# Patient Record
Sex: MALE | Race: WHITE
[De-identification: names, ages, dates, MRNs, and addresses within clinical notes are randomized per-mention and may not be internally consistent; named-entity substitution may affect disease eponyms.]

---

## 2018-05-07 ENCOUNTER — HOSPITAL ENCOUNTER (OUTPATIENT)
Dept: HOSPITAL 17 - HDIC | Age: 68
LOS: 1 days | Discharge: HOME | End: 2018-05-08
Attending: INTERNAL MEDICINE
Payer: COMMERCIAL

## 2018-05-07 VITALS
SYSTOLIC BLOOD PRESSURE: 111 MMHG | OXYGEN SATURATION: 95 % | HEART RATE: 66 BPM | DIASTOLIC BLOOD PRESSURE: 63 MMHG | RESPIRATION RATE: 18 BRPM | TEMPERATURE: 97.8 F

## 2018-05-07 VITALS
DIASTOLIC BLOOD PRESSURE: 52 MMHG | HEART RATE: 60 BPM | OXYGEN SATURATION: 97 % | TEMPERATURE: 97.8 F | SYSTOLIC BLOOD PRESSURE: 100 MMHG | RESPIRATION RATE: 18 BRPM

## 2018-05-07 VITALS
DIASTOLIC BLOOD PRESSURE: 67 MMHG | TEMPERATURE: 97.6 F | SYSTOLIC BLOOD PRESSURE: 112 MMHG | HEART RATE: 69 BPM | RESPIRATION RATE: 16 BRPM | OXYGEN SATURATION: 94 %

## 2018-05-07 VITALS
SYSTOLIC BLOOD PRESSURE: 118 MMHG | TEMPERATURE: 98 F | DIASTOLIC BLOOD PRESSURE: 75 MMHG | RESPIRATION RATE: 14 BRPM | HEART RATE: 61 BPM | OXYGEN SATURATION: 98 %

## 2018-05-07 VITALS
OXYGEN SATURATION: 99 % | HEART RATE: 99 BPM | TEMPERATURE: 98 F | RESPIRATION RATE: 16 BRPM | SYSTOLIC BLOOD PRESSURE: 149 MMHG | DIASTOLIC BLOOD PRESSURE: 102 MMHG

## 2018-05-07 VITALS — HEART RATE: 65 BPM

## 2018-05-07 VITALS — HEART RATE: 62 BPM

## 2018-05-07 VITALS — HEART RATE: 72 BPM

## 2018-05-07 VITALS — HEART RATE: 61 BPM

## 2018-05-07 DIAGNOSIS — J44.9: ICD-10-CM

## 2018-05-07 DIAGNOSIS — I48.91: Primary | ICD-10-CM

## 2018-05-07 DIAGNOSIS — I10: ICD-10-CM

## 2018-05-07 DIAGNOSIS — E78.5: ICD-10-CM

## 2018-05-07 DIAGNOSIS — I47.1: ICD-10-CM

## 2018-05-07 DIAGNOSIS — G47.30: ICD-10-CM

## 2018-05-07 DIAGNOSIS — Z79.01: ICD-10-CM

## 2018-05-07 LAB
BASOPHILS # BLD AUTO: 0.1 TH/MM3 (ref 0–0.2)
BASOPHILS NFR BLD: 0.9 % (ref 0–2)
BUN SERPL-MCNC: 22 MG/DL (ref 7–18)
CALCIUM SERPL-MCNC: 9.2 MG/DL (ref 8.5–10.1)
CHLORIDE SERPL-SCNC: 105 MEQ/L (ref 98–107)
CREAT SERPL-MCNC: 1 MG/DL (ref 0.6–1.3)
EOSINOPHIL # BLD: 0.3 TH/MM3 (ref 0–0.4)
EOSINOPHIL NFR BLD: 4.3 % (ref 0–4)
ERYTHROCYTE [DISTWIDTH] IN BLOOD BY AUTOMATED COUNT: 12.9 % (ref 11.6–17.2)
GFR SERPLBLD BASED ON 1.73 SQ M-ARVRAT: 74 ML/MIN (ref 89–?)
GLUCOSE SERPL-MCNC: 97 MG/DL (ref 74–106)
HCO3 BLD-SCNC: 28.8 MEQ/L (ref 21–32)
HCT VFR BLD CALC: 44.5 % (ref 39–51)
HGB BLD-MCNC: 15.3 GM/DL (ref 13–17)
INR PPP: 1.1 RATIO
LYMPHOCYTES # BLD AUTO: 1.6 TH/MM3 (ref 1–4.8)
LYMPHOCYTES NFR BLD AUTO: 21.2 % (ref 9–44)
MCH RBC QN AUTO: 31.8 PG (ref 27–34)
MCHC RBC AUTO-ENTMCNC: 34.2 % (ref 32–36)
MCV RBC AUTO: 92.8 FL (ref 80–100)
MONOCYTE #: 0.8 TH/MM3 (ref 0–0.9)
MONOCYTES NFR BLD: 10.8 % (ref 0–8)
NEUTROPHILS # BLD AUTO: 4.7 TH/MM3 (ref 1.8–7.7)
NEUTROPHILS NFR BLD AUTO: 62.8 % (ref 16–70)
PLATELET # BLD: 222 TH/MM3 (ref 150–450)
PMV BLD AUTO: 7.9 FL (ref 7–11)
PROTHROMBIN TIME: 10.8 SEC (ref 9.8–11.6)
RBC # BLD AUTO: 4.8 MIL/MM3 (ref 4.5–5.9)
SODIUM SERPL-SCNC: 140 MEQ/L (ref 136–145)
WBC # BLD AUTO: 7.5 TH/MM3 (ref 4–11)

## 2018-05-07 PROCEDURE — 85002 BLEEDING TIME TEST: CPT

## 2018-05-07 PROCEDURE — C1730 CATH, EP, 19 OR FEW ELECT: HCPCS

## 2018-05-07 PROCEDURE — C1731 CATH, EP, 20 OR MORE ELEC: HCPCS

## 2018-05-07 PROCEDURE — 80048 BASIC METABOLIC PNL TOTAL CA: CPT

## 2018-05-07 PROCEDURE — 93662 INTRACARDIAC ECG (ICE): CPT

## 2018-05-07 PROCEDURE — 00537 ANES CARDIAC EP PROCEDURES: CPT

## 2018-05-07 PROCEDURE — 93613 INTRACARDIAC EPHYS 3D MAPG: CPT

## 2018-05-07 PROCEDURE — 93656 COMPRE EP EVAL ABLTJ ATR FIB: CPT

## 2018-05-07 PROCEDURE — 85610 PROTHROMBIN TIME: CPT

## 2018-05-07 PROCEDURE — C1766 INTRO/SHEATH,STRBLE,NON-PEEL: HCPCS

## 2018-05-07 PROCEDURE — 86900 BLOOD TYPING SEROLOGIC ABO: CPT

## 2018-05-07 PROCEDURE — 93623 PRGRMD STIMJ&PACG IV RX NFS: CPT

## 2018-05-07 PROCEDURE — 93005 ELECTROCARDIOGRAM TRACING: CPT

## 2018-05-07 PROCEDURE — C2630 CATH, EP, COOL-TIP: HCPCS

## 2018-05-07 PROCEDURE — 93312 ECHO TRANSESOPHAGEAL: CPT

## 2018-05-07 PROCEDURE — 93320 DOPPLER ECHO COMPLETE: CPT

## 2018-05-07 PROCEDURE — 93325 DOPPLER ECHO COLOR FLOW MAPG: CPT

## 2018-05-07 PROCEDURE — 86850 RBC ANTIBODY SCREEN: CPT

## 2018-05-07 PROCEDURE — 85730 THROMBOPLASTIN TIME PARTIAL: CPT

## 2018-05-07 PROCEDURE — 86901 BLOOD TYPING SEROLOGIC RH(D): CPT

## 2018-05-07 PROCEDURE — 92960 CARDIOVERSION ELECTRIC EXT: CPT

## 2018-05-07 PROCEDURE — 85025 COMPLETE CBC W/AUTO DIFF WBC: CPT

## 2018-05-07 PROCEDURE — C1759 CATH, INTRA ECHOCARDIOGRAPHY: HCPCS

## 2018-05-07 PROCEDURE — C1732 CATH, EP, DIAG/ABL, 3D/VECT: HCPCS

## 2018-05-07 RX ADMIN — APIXABAN SCH MG: 5 TABLET, FILM COATED ORAL at 20:34

## 2018-05-07 RX ADMIN — PHENYLEPHRINE HYDROCHLORIDE SCH MLS/HR: 10 INJECTION INTRAVENOUS at 07:37

## 2018-05-07 NOTE — CATHPROC
Patient Name: SHAHRAM RAMIREZ   MRN: [id number]       Study #: [id number]

Initial MD: Leidy Mc       YOB: 1950  Study Date: 05/07/2018

 

 

              Cardiac Catheterization Report

 

 

 

 

5/7/2018 10:33:05 AM        Financial #: [id number]     1 of 11

Patient Name: SHAHRAM RAMIREZ         MRN: [id number]                  Study #: [id number]

Initial MD: Leidy Mc            YOB: 1950             Study Date: 05/07/2018

 

 

                    Entire Case Report

Patient Information

Patient Name    SHAHRAM RAMIREZ   Date of Birth   1950        Age     67 years

 

MRN         [id number]          Financial #    [id number]       Gender    M

 

AlternateID                   Lab Number     2

 

Accession #     [id number]      Room Number    DC05

 

Height (in)     70.0             Height (cm)    177.8           BSA     2.12

 

Weight (lbs)    207.5            Weight (kg)    94.3

 

Patient Address/Phone Number

Home Address              City                 State   Zip Code      Home Phone Number

 

[address] [facility]    [address] [phone]

 

Study Information

Study Number    Admission          Scheduled Start                Study Start

 

[id number]    May 7 2018 6:38AM      05/07/2018                   May 7 2018 7:04AM

 

[place] [facility]

 

Electrophysiology Study

 

Admit Source              Facility Department

 

Other                  [facility] - Cath Lab

 

Physician and Clinical Staff

Initial Leidy Humphrey          Circulator        Matty Poole,RT(R)

 

                         Circulator        Paulino, Em,RCIS

 

                         Other           Anesthesia, CRNA

 

                         Recorder         Esperanza Maki,LEVI

 

                         Scrub           Taylor Shelley,RRT TECH2

 

Procedures Performed

Procedure                    Location (Site)               Vessel Name

 

Ablation Procedure

Cardioversion

ICE CATHETER INSERT               RA                      Atruim

RF Ablation                   LT. ATRIUM                  LT. ATRIUM

 

 

 

 

5/7/2018 10:33:05 AM              Financial #: [id number]                2 of 11

Patient Name: SHAHRAM RAMIREZ        MRN: [id number]       Study #: [id number]

Initial MD: Leidy Mc            YOB: 1950  Study Date: 05/07/2018

 

 

Equipment

Time        Description            Size   Mfg Part Number  Used/Scraped

                NEEDLE, TRANSSEPTAL NRG 98         [id number]

07:05   [facility]                                   Used

                C1                     *[id number]

     [employer]/ EP                       [id number]

07:05              KIT, TRANSDUCER / AFIB                    Used

     PACER                               *[id number]

                                      [id number]

                CATHETER, TACTICATH ABLAT          BUNDLE

07:05   BUNDLE-[employer]                                   Used

                65 BUNDLE                  *[id number]-

                                      BUNDLE

                                      [id number]

                CATHETER, FR7 OPTIMA SPIRAL

07:05   BUNDLE-[employer]                     FR7   *[id number]-     Used

                BUNDLE

                                      BUNDLE

                                      [id number]

07:05   BUNDLE-[employer]    CATHETER, JSN, QUAD BUNDLE     FR 5   *[id number]-     Used

                                      BUNDLE

                                      [id number]

07:05   BUNDLE-[employer]    CATHETER, JSN, QUAD BUNDLE     FR 5   *[id number]-     Used

                                      BUNDLE

                                      [id number]

                SET, COOL POINT TUBING

07:05   BUNDLE-[employer]                          *[id number]-     Used

                BUNDLE

                                      BUNDLE

                SHEATH, FR8.5 STEERABLE SM

07:05   BUNDLE-[employer]                     71CM   [id number]   Used

                71CM BUNDLE

                COVER, TRANSDUCER CABLE           [phone]

07:05   [employer]                                  Used

                ACUNAV                   *[id number]

                                      [id number]

07:05   [employer]/PACER     SHEATH, FR10 CHUCK 11CM      FR 10           Used

                                      *[id number]

07:05   [employer]/PACER     SHEATH, FR9 CHUCK 11CM      FR 9   [id number]     Used

                                      [id number]

07:05   [employer]  PACK, CCL CUSTOM          *             Used

                                      *[id number]

07:05   [employer]     ADDISON, LIMB            *    2530 *[id number]   Used

 

08:24   [employer]     SHEATH, FR5.5 PRELUDE 11CM     FR 5   [id number]  Used

                                      [id number]

07:05   NAMIC         TUBING, HIGH PRESSURE 48"     48"            Used

                                      *[id number]

                                      [id number]

07:05   NAMIC         TUBING, HIGH PRESSURE 48"     48"            Used

                                      *[id number]

                                      [id number]

07:05   [employer]     BLANKET,WARM AIR CCL        *             Used

                                      *[id number]

                                      [id number]

07:05   [employer]   ELECTRODE KIT, SONIA X SURFACE *                Used

                                      *[id number]

                                      [id number]

07:05   [employer]   SHEATH, EPS, FR6 FAST CATH     FR 6            Used

                                      *[id number]

07:05   [employer]   SHEATH, EPS, FR7 FAST CATH     FR 7   [id number]      Used

                                      [id number]

07:05   [employer]   SHEATH, EPS, FR8 FAST CATH     FR 8            Used

                                      *[id number]

                CATHETER, ACUNAV FR10 ICE          [id number]

08:29   MILAGRO                         FR 10           Used

                (MILAGRO)                  *[id number]

     [facility]     PAD, ELECTROSURGICAL

07:05                               *     *[id number] Used

     SURGICAL       GROUNDING (BLUE)

 

 

 

 

5/7/2018 10:33:05 AM             Financial #: [id number]      3 of 11

Patient Name: SHAHRAM RAMIREZ           MRN: [id number]              Study #: [id number]

Initial MD: Leidy Mc               YOB: 1950         Study Date: 05/07/2018

 

 

Insurance Information

Insurance Payor

 

Medicare

 

Third Party         Third Party Number

 

BC MEDICARE [id number]

 

 

History: Allergies

Allergy              Reaction

 

No Known Allergies

 

 

History: Risk Factors

Dyslipidemia

 

Yes

 

 

Labs

Hgb (g/dl)      Hct (%)         RBC (MIL/MM3)     WBC (l/cumm)      Platelets (thousands)

 

11.60-17.00     35.00-51.00       4.00-5.90       4.00-11.00       150..00

 

15.0         44           4.8          7.5          222

 

Glucose (mg/dl)   BUN (mg/dl)       Creatinine (mg/dl)  BUN:Creatinine (1:x)

74..00     7.00-18.00       0.50-1.30       10.00-20.00

 

97          22           1.0          22

 

Na (meq/l)      K (meq/l)

 

136..00    3.50-5.10

 

140         3.9

 

INR (PTT:PT)

 

0.90-1.10

 

1.1

 

 

 

 

Medication

Medication Total Dose (Bolus/Oral)

Medication            Total Dosage/Unit

 

1% XYLOCAINE              40 mL

 

HEPARIN              38931 units

 

LASIX                 40 mg

 

PROTAMINE               40 mg

 

 

 

 

5/7/2018 10:33:05 AM                Financial #: [id number]             4 of 11

Patient Name: SHAHRAM RAMIREZ             MRN: [id number]                  Study #: [id number]

Initial MD: Leidy Mc                YOB: 1950             Study Date: 05/07/2018


 

Medications (Bolus/Oral)

Medication          Time Given          Dosage/Unit      Administered By      Reason

 

1% XYLOCAINE         5/7/2018 8:20:47 AM      20 mL        Leidy Mc

20 mL 1% XYLOCAINE given in lab by Leidy Mc in Left Groin via Subcutaneous.

 

1% XYLOCAINE         5/7/2018 8:24:55 AM      20 mL        Leidy Mc

20 mL 1% XYLOCAINE given in lab by Leidy Mc in Right Groin via Subcutaneous.

 

HEPARIN           5/7/2018 8:32:00 AM    12501 units       Anesthesia, CRNA      As per physicians ve
rbal order

85261 units HEPARIN given in lab by Anesthesia, CRNA via Peripheral IV. Ordered by Leidy Mc. Augustina
son: As per physicians verbal order.

 

HEPARIN           5/7/2018 8:50:00 AM     2000 units       Anesthesia, CRNA      As per physicians ve
rbal order

2000 units HEPARIN given in lab by Anesthesia, CRNA via Peripheral IV. Ordered by Leidy Mc. Reas
on: As per physicians verbal order.

 

HEPARIN           5/7/2018 9:06:01 AM     1000 units       Anesthesia, CRNA      As per physicians ve
rbal order

1000 units HEPARIN given in lab by Anesthesia, CRNA via Peripheral IV. Ordered by Leidy Mc. Reas
on: As per physicians verbal order.

 

LASIX            5/7/2018 10:22:00 AM      40 mg        Anesthesia, CRNA      As per physicians verba
l order

40 mg LASIX given in lab by Anesthesia, CRNA via Peripheral IV. Ordered by Leidy Mc. Reason: As 
per physicians verbal order.

 

PROTAMINE          5/7/2018 10:22:43 AM      40 mg        Anesthesia, CRNA      As per physicians mendy
bal order

40 mg PROTAMINE given in lab by Anesthesia, CRNA via Peripheral IV. Ordered by Leidy Mc. Reason:
 As per physicians verbal order.

 

Medication (Drip)

Medication          Time Given          Dosage/Unit      Concentration/Unit  Diluent (ml)   Solution

 

HEPARIN DRIP         5/7/2018 8:50:50 AM     1000 units/hr       54089 units    250        D5W

1000 units/hr HEPARIN DRIP given in lab by Anesthesia, CRNA via Peripheral IV. Pump/Drip Flow = 10 ml
/hr using D5W with a concentration of

25465 units in 250 ml. Ordered by Leidy Mc. Reason: As per physicians verbal order.

ISUPREL           5/7/2018 9:58:00 AM      20 mcg/min         1 mg     250        NaCl .9

20 mcg/min ISUPREL given in lab by Anesthesia, CRNA via Peripheral IV. Pump/Drip Flow = 300 ml/hr usi
ng NaCl .9 with a concentration of 1 mg in

250 ml. Ordered by Leidy Mc. Reason: As per physicians verbal order.

 

Initial Case Assessment

 

 

 

 

5/7/2018 10:33:05 AM                 Financial #: [id number]                   5 of 11

Patient Name: SHAHRAM RAMIREZ         MRN: [id number]          Study #: [id number]

Initial MD: Leidy Mc             YOB: 1950     Study Date: 05/07/2018

 

 

Initial Case Assessment

Cardiovascular

HR           Rhythm         NIBP          Chest Pain

 

101           af           143/100         0

 

Edema Present      Skin color           Skin

 

None          Normal             Warm Dry

 

Circulatory - Right Pulses

Dorsalis Pedis

 

1

 

Scale (0,1,2,3,4,d)

 

Circulatory - Left Pulses

Dorsalis Pedis

 

1

 

Scale (0,1,2,3,4,d)

 

Circulatory - Lower Extremities

Color Lower Right    Color Lower Left

 

 

Normal         Normal

 

Neurological State

            Oriented to time-place-

Alert                      Moves all extremities

            person

 

Respiration - General

Respiration Rate

            SpO2 (%)

(B/min)

18           99

 

 

 

 

5/7/2018 10:33:05 AM              Financial #: [id number]        6 of 11

Patient Name: SHAHRAM RAMIREZ          MRN: [id number]                Study #: [id number]

Initial MD: Leidy Mc              YOB: 1950           Study Date: 05/07/2018

 

 

Final Case Assessment

Cardiovascular

HR           Rhythm          NIBP          Chest Pain

 

79           sr            104/45         0

 

Edema Present      Skin color           Skin

 

None           Normal             Warm Dry

 

Circulatory - Right Pulses

Dorsalis Pedis

 

1

 

Scale (0,1,2,3,4,d)

 

Circulatory - Left Pulses

Dorsalis Pedis

 

1

 

Scale (0,1,2,3,4,d)

 

Circulatory - Lower Extremities

Color Lower Right    Color Lower Left

 

 

Normal         Normal

 

Neurological State

Unresponsive

 

Comment: intubated

 

Respiration - General

Respiration Rate

            SpO2 (%)

(B/min)

12           99

 

 

 

 

Chronological Log

Time    Study Chronological Log

 

7:37:00   Patient arrived via Bed.

 

7:37:00   Patient Name, D.O.B, / Armband Verified By R.N.

 

7:37:01   Consent signed by the physician and the patient and verified by the Cath Lab staff.

 

7:37:03   Pre-op and post- op instructions given; patient acknowledges understanding of instructions.


 

7:37:04   Verbal Stimulation=2 Physical Stimulation=2 Airway=2 Respiration=2 TOTAL=8. (0=absent, 1=li
mited, 2=present)

 

7:37:21   Patient has been NPO for More than 6Hrs.

 

7:37:22   Skin Breakdown-none per pt

 

7:37:40   Patient Warmer Placed on the Table.

 

 

5/7/2018 10:33:05 AM               Financial #: [id number]              7 of 11

Patient Name: SHAHRAM RAMIREZ            MRN: [id number]               Study #: [id number]

Initial MD: Leidy Mc               YOB: 1950          Study Date: 05/07/2018

 

7:37:43   Disposable Defibrillator Pads Placed On Patient.

 

7:37:43   Disposable Defibrillator Pads Placed On Patient.

 

7:37:44   Disposable Defibrillator Pads Placed On Patient.

 

7:37:46   Norma Prominences Protected

 

7:37:51   A # 20 IV was noted in the Antecubital (left). Grade = 0 0.9ns kvo

 

7:38:06   A # 20 IV was noted in the Antecubital (right). Grade = 0 0.9ns kvo

 

7:38:17   History and physical on the chart or being dictated.

 

7:53:59   Assessment: Initial Case

 

7:54:01   Table restraints applied according to hospital policy

      Assessment: Initial Case, RR=753 BPM, Rhythm=af, NZDQ=407/100 mmhg, Chest Pain=0, Edema=None,

      Color=Normal, Skin = Warm, Dry

      Right Pulses: Yung Ped=1

      Left Pulses: Yung Ped=1

7:55:03   Feet cool to touch.

      Lower Right Extremities: Color=Normal

      Lower Left Extremities: Color=Normal

      Neurological: State=Alert, Ox3, FERRELL

      Respiration: Resp=18 B/min, SpO2=99 %

8:02:51   External catheter placed.

 

8:03:13   Bilateral groins prepped with 2% chlorhexidine, and draped after a 3 minute waiting time.

 

8:07:28   Anesthesiologist present for intubation.

      Time Out. Correct patient, procedure, procedure equipment, site and side verified with physicia
n present. Time

8:18:00

      concurred by MD, individual staff and CRNA.

      Time Out #2 - Consents verified, patient in correct position, all results are labled and displa
yed, safety precautions

8:18:24

      taken, antibiotics administered. Time out concurred by MD, individual staff and CRNA in procedu
re

8:18:28   Case Start

 

8:18:42   Alfonso in progress.

 

8:20:18   Alfonso complete.

 

8:20:47   20 mL 1% XYLOCAINE given in lab by Leidy Mc in Left Groin via Subcutaneous.

 

8:21:07   Vascular access was obtained in the Fem Vein (left).

 

8:21:20   Vascular access was obtained in the Fem Vein (left).

 

8:21:51   Vascular access was obtained in the Fem Vein (left).

 

8:22:18   Vascular access was obtained in the Fem Art (left).

      A SHEATH, FR5.5 PRELUDE 11CM FR 5 was advanced into the Fem Art (left) using the Modified Seldi
nger technique.

8:22:39

      0.9ns pressure bag connected.

8:24:04   A SHEATH, EPS, FR6 FAST CATH FR 6 was advanced into the Fem Vein (left) using the Modified 
Seldinger technique.

 

8:24:13   A SHEATH, EPS, FR7 FAST CATH FR 7 was advanced into the Fem Vein (left) using the Modified 
Seldinger technique.

 

8:24:17   A SHEATH, FR10 CHUCK 11CM FR 10 was advanced into the Fem Vein (left) using the Modified S
eldinger technique.

 

8:24:55   20 mL 1% XYLOCAINE given in lab by Leidy Mc in Right Groin via Subcutaneous.

 

8:25:17   Vascular access was obtained in the Fem Vein (right).

 

8:25:34   A SHEATH, EPS, FR8 FAST CATH FR 8 was advanced into the Fem Vein (right) using the Modified
 Seldinger technique.

 

8:26:16   CATHETER, ACUNAV FR10 ICE (MILAGRO) FR 10 Was Postioned.

 

 

 

5/7/2018 10:33:05 AM               Financial #: [id number]               8 of 11

Patient Name: SHAHRAM RAMIREZ          MRN: [id number]               Study #: [id number]

Initial MD: Leidy Mc              YOB: 1950          Study Date: 05/07/2018

 

      A CATHETER, JSN, QUAD BUNDLE FR 5 was advanced vis Fem Vein (left) and placed in the CS. Placem
ent was visually

8:27:04

      confirmed under fluoroscopy.

      A CATHETER, JSN, QUAD BUNDLE FR 5 was advanced vis Fem Vein (left) and placed in the HIS. Place
ment was

8:27:21

      visually confirmed under fluoroscopy.

      A SHEATH, FR8.5 STEERABLE SM 71CM BUNDLE 71CM was exchanged in the Fem Vein (right). This was n
ecessary in

8:27:30

      order for catheter support.

8:28:33   Tow in

 

8:29:51   Beginning temp 36.0.

      51822 units HEPARIN given in lab by Anesthesia, CRNA via Peripheral IV. Ordered by Satrhak Mc Reason: As per

8:32:00

      physicians verbal order.

8:37:25   A eps was advanced to the right atrium and passed through the septal wall to the left atriu
m.

 

8:37:32   Tow out

      A CATHETER, FR7 OPTIMA SPIRAL BUNDLE FR7 was advanced vis Fem Vein (right) and placed in the LA
. Placement

8:38:48

      was visually confirmed under fluoroscopy. Mapping in progress.

8:41:36   Activated Clotting Time Drawn

 

8:49:09   ACT (Normal Range ) = 340

      2000 units HEPARIN given in lab by Anesthesia, CRNA via Peripheral IV. Ordered by Leidy Mc Reason: As per

8:50:00

      physicians verbal order.

      1000 units/hr HEPARIN DRIP given in lab by Anesthesia, CRNA via Peripheral IV. Pump/Drip Flow =
 10 ml/hr using

8:50:50

      D5W [place] [address] units in 250 ml. Ordered by Leidy Mc. Reason: As per [facility] verbal order.

8:50:50   Mapping complete. Catheter was removed

      A CATHETER, TACTICATH ABLAT 65 BUNDLE was advanced vis Fem Vein (right) and placed in the LA. P
lacement was

8:51:32

      visually confirmed under fluoroscopy.

8:51:42   RF Ablation of the LT. ATRIUM with a CATHETER,  TACTICATH ABLAT 65 BUNDLE.

 

8:57:41   Activated Clotting Time Drawn

 

9:05:23   ACT (Normal Range ) = 348

      1000 units HEPARIN given in lab by Anesthesia, CRNA via Peripheral IV. Ordered by Leidy Mc
. Reason: As per

9:06:01

      physicians verbal order.

9:11:26   Activated Clotting Time Drawn

 

9:17:19   ACT (Normal Range ) = 364

 

9:41:51   Ablation complete. Catheter was removed

      A CATHETER, FR7 OPTIMA SPIRAL BUNDLE FR7 was advanced vis Fem Vein (right) and placed in the LA
. Placement

9:42:18

      was visually confirmed under fluoroscopy.

9:45:47   Mapping catheter out.

      A CATHETER, TACTICATH ABLAT 65 BUNDLE was advanced vis Fem Vein (right) and placed in the LA. P
lacement was

9:45:59

      visually confirmed under fluoroscopy.

9:46:11   RF Ablation of the LT. ATRIUM with a eps.

 

9:46:25   Activated Clotting Time Drawn

 

9:53:20   ACT (Normal Range ) = 347

 

9:57:12   ECG rhythm of AF noted. Patient cardioverted at 200 joules. Success synch

      20 mcg/min ISUPREL given in lab by Anesthesia, CRNA via Peripheral IV. Pump/Drip Flow = 300 ml/
hr using NaCl .9

9:58:00

      with a concentration of 1 mg in 250 ml. Ordered by Leidy Mc. Reason: As per physicians mendy
bal order.

9:58:05   Ending body temp 36.2

 

10:10:06  Isuprel off

 

10:12:00  All catheter(s) removed without difficulty

 

5/7/2018 10:33:05 AM               Financial #: [id number]              9 of 11

Patient Name: SHAHRAM RAMIREZ            MRN: [id number]              Study #: [id number]

Initial MD: Leidy Mc               YOB: 1950         Study Date: 05/07/2018

 

       A SHEATH, FR9 CHUCK 11CM FR 9 was exchanged in the Fem Vein (right). This was necessary in or
jonel to minimize

10:12:50

       site leakage.

10:15:23   PACU called. Spoke to Balbina

 

10:16:25   Bedside Report will be given.

 

10:16:31   Sheath(s) left in place, secured, 0.9ns kvo connected and will be removed in Holding Area

 

10:16:52   Defibrillator and ground pads removed. Skin intact.

 

10:16:56   Ablation procedure performed: AFIB.

 

10:17:00   Sterile dressing applied to site

 

10:17:01   Ablation procedure performed: AFIB.

 

10:17:09   EP Procedure was performed.

       Assessment: Final Case, HR=79 BPM, Rhythm=sr, JTIG=444/45 mmhg, Chest Pain=0, Edema=None, [place]
r=Normal,

       Skin = Warm, Dry

       Right Pulses: Yung Ped=1

       Left Pulses: Yung Ped=1

10:17:49

       Lower Right Extremities: Color=Normal

       Lower Left Extremities: Color=Normal

       Neurological: State=Unresponsive, Comment=intubated

       Respiration: Resp=12 B/min, SpO2=99 %

       40 mg LASIX given in lab by Anesthesia, CRNA via Peripheral IV. Ordered by Leidy Mc. Reas
on: As per physicians

10:22:00

       verbal order.

       40 mg PROTAMINE given in lab by Anesthesia, CRNA via Peripheral IV. Ordered by Leidy Mc. 
Reason: As per

10:22:43

       physicians verbal order.

10:28:55   Activated Clotting Time Drawn

 

10:32:00   Pt extubated. To pacu w crna, 02, monitor

 

10:32:33   ACT (Normal Range ) = 126

 

10:35:10   Patient moved to stretcher

 

 

 

 

End Study - Contrast Media Used In Study

Contrast        Total Opened (mL)     Total Used (mL)      Total Wasted (mL)

 

Unspecified      0             0             0

 

End Study - Maximum Contrast Load

Max Contrast Load (mL)

 

471.6

 

End Study - Radiation Exposure

Fluoro Time

(minutes)

3.2

 

 

 

 

5/7/2018 10:33:05 AM                 Financial #: [id number]              10 of 11

Patient Name: SHAHRAM RAMIREZ    MRN: [id number]       Study #: [id number]

Initial MD: Leidy Mc        YOB: 1950  Study Date: 05/07/2018

 

End Study - Patient Disposition

Complications    Transferred To   Interventional Outcome

 

No          Telemetry Bed    successful

 

 

 

 

5/7/2018 10:33:05 AM         Financial #: [id number]     11 of 11

## 2018-05-07 NOTE — PD.CARD
Atrial Fibrillation Ablation


PROCEDURE DATE:  May 7, 2018


PROCEDURES PERFORMED:


1. Electrophysiology study on Isuprel infusion


2. CS cannulation


3. 3-D mapping


4. Transseptal approach


5. Right and left heart catheterization


6. Intracardiac echo


7. Radiofrequency ablation of atrial fibrillation


8. Pulmonary vein isolation


9. Posterior wall ablation


10. Mitral valve isolation


11. Mitral line creation


12. Left atrial tachycardia ablation


13. Left atrial appendage ablation


14. Floor line creation


15. Anterior wall ablation


16. Cardioversion





INDICATIONS FOR THE PROCEDURE


Mr. Mohan is a 67-year-old  male with atrial fibrillation, very 

symptomatic referred for electrophysiology study and ablation. .


The risks, the nature and the benefits of the procedure were clearly stated to 

him.  The risks include pneumothorax, cardiac perforation, stroke, need for 

open heart surgery and even death.  The patient understood and agreed to 

proceed.





DESCRIPTION OF THE PROCEDURE IN DETAIL


As  written informed consent was obtained prior to esophageal echocardiogram, 

the patient was kept on the table where he was prepped and draped in the usual 

sterile fashion.  Conscious sedation was initiated and maintained throughout 

the procedure by the anesthesiologist.  Once sedation was verified, the right 

and left inguinal areas were anesthetized with 2% Xylocaine.  Using modified 

Seldinger technique, the left femoral vein was cannulated on three occasions, 

three guidewires were advanced.  Over the wire a 6, 7 and a 10-Czech Hemaquet 

were advanced.  Then the left femoral artery was cannulated on one occasion, 

one guidewire was advanced.  Over the wire a 4-Czech Hemaquet was advanced.  

Then the right femoral vein was cannulated on one occasion, one guidewire was 

advanced.  Over the wire a 8-Czech Hemaquet was advanced.  Then under 

fluoroscopic guidance through the 6 and 7-Czech Hemaquet, two 5-Czech 

Olman curved quadripolar electrophysiology catheters were advanced and 

placed around the His as well as coronary sinus.  Basic interval was measured.  

The patient was in atrial fibrillation.  Through the 10-Czech Hemaquet, a 

Cordis Min AcuNav intracardiac echo catheter was advanced and placed at the 

right atrium.  Multiple view was obtained.  There was no pericardial effusion, 

pulmonary vein was seen, atrial septal was visualized.  Then the 8-Czech 

Hemaquet in the right femoral vein was exchanged for Agilis transseptal sheath 

that was placed all the way to the superior vena cava.  Through the sheath a 

Marie needle was advanced, then the sheath, the dilator and the needle were 

progressed until foci engaged.  Once engaged, the needle was advanced.  RF was 

delivered for 2 seconds.  I was able to cross into the left atrium.  Once the 

needle crossed, the dilator was advanced.  Once the dilator crossed, the sheath 

was advanced.  Once the sheath crossed, the dilator and the needle were 

removed.  At this point I did flood the system and fluid movement was seen in 

the left atrium the indicates the sheath is in good position.  The patient 

already received 10,000 units of heparin.  The goal is to keep an ACT around 

350 during ablation.  Then through the sheath a [employer] Avery 20 pulse 

circumferential catheter was advanced.  Using [employer] endocardial solution 

mapping system, a two-dimensional configuration of the left atrium was 

obtained.  Points were taken at the left superior and inferior veins, right 

superior and inferior veins, mitral valve, and appendages.  Then through the 

sheath a [employer]. Avery TactiCath 65cm 3.5mm irrigated tipped mapping and 

radiofrequency ablation catheter was advanced.  Esophageal probe was placed 

temperature monitoring during ablation.  When it increased to 0.5 degrees 

Celsius above baseline, I moved to a different area of the atrium.  First I did 

isolate the left superior and inferior vein..  Posterior was ablated.  A floor 

line was created, a mitral line was isolated, then the mitral valve was 

isolated.  At that point the patient was in left atrial tachycardia. left 

atrial appendage was isolated.





I did create a line from the floor to the roof area, passing by the left atrial 

appendage.  Then the right superior and inferior veins were isolated.  I did 

remap the atrium.  There is no significant signal in the atrium. I did 

decannulate transeptal and proceed with CS isolation. Part of tricuspid valve 

was ablated.  At this point I decided to proceed with cardioversion.  A 200 

sync biphasic joule was delivered that converted the patient into sinus rhythm.

  At that point I did advance the circumferential catheter again into the vein.

  There was no signal into the vein, pacing from the vein showed no conduction 

to the atrium.  Isuprel infusion was initiated at 20 mcg for over 10 minutes.  

No tachyarrhythmia was induced, post Isuprel no tachyarrhythmia was induced.  

At that point the procedure was complete.  All catheters were removed, atrial 

septal sheath was exchanged for 9-Czech Hemaquet, intracardiac echo showed no 

pericardial effusion.  There is still good flow in the pulmonary vein.  The 

patient is going to be transferred to the recovery room.  No incident report.  

The patient tolerated the procedure.  Blood loss was minimal.





FINDINGS


1. Electrocardiogram: At baseline the patient was in atrial fibrillation, post 

procedure the patient was in sinus rhythm.


2. Basic interval: Base cycle length was around 600.  Post ablation she was 

around 980 milliseconds.  AH at 96 and HV at 45 milliseconds.


3. Tachyarrhythmia: Atrial fibrillation was mapped and ablated.  Atrial 

tachycardia was ablated.  The ablation was successful.





CONCLUSION


Successful electrophysiology study, mapping, radiofrequency ablation of atrial 

fibrillation, left atrial tachycardia, pulmonary vein isolation, posterior 

ablation, mitral valve isolation, mitral line creation, left atrial appendage 

isolation, floor line creation and cardioversion.





COMMENTS AND RECOMMENDATIONS


The patient is going to be transferred to the telemetry unit.  Will be observed 

and when stable can be discharged home.











Leidy Mc MD May 7, 2018 10:39

## 2018-05-08 VITALS
RESPIRATION RATE: 19 BRPM | TEMPERATURE: 97.6 F | OXYGEN SATURATION: 95 % | HEART RATE: 60 BPM | DIASTOLIC BLOOD PRESSURE: 67 MMHG | SYSTOLIC BLOOD PRESSURE: 135 MMHG

## 2018-05-08 VITALS — HEART RATE: 64 BPM

## 2018-05-08 VITALS
OXYGEN SATURATION: 94 % | TEMPERATURE: 98.3 F | DIASTOLIC BLOOD PRESSURE: 64 MMHG | HEART RATE: 66 BPM | SYSTOLIC BLOOD PRESSURE: 103 MMHG | RESPIRATION RATE: 18 BRPM

## 2018-05-08 VITALS
OXYGEN SATURATION: 97 % | DIASTOLIC BLOOD PRESSURE: 71 MMHG | TEMPERATURE: 98.1 F | RESPIRATION RATE: 18 BRPM | HEART RATE: 71 BPM | SYSTOLIC BLOOD PRESSURE: 140 MMHG

## 2018-05-08 VITALS — HEART RATE: 70 BPM

## 2018-05-08 VITALS — HEART RATE: 60 BPM

## 2018-05-08 VITALS — HEART RATE: 94 BPM

## 2018-05-08 VITALS — HEART RATE: 67 BPM

## 2018-05-08 VITALS — HEART RATE: 66 BPM

## 2018-05-08 VITALS — HEART RATE: 69 BPM

## 2018-05-08 VITALS — HEART RATE: 63 BPM

## 2018-05-08 VITALS — HEART RATE: 75 BPM

## 2018-05-08 LAB
INR PPP: 1.1 RATIO
PROTHROMBIN TIME: 10.9 SEC (ref 9.8–11.6)

## 2018-05-08 RX ADMIN — PHENYLEPHRINE HYDROCHLORIDE SCH MLS/HR: 10 INJECTION INTRAVENOUS at 00:35

## 2018-05-08 RX ADMIN — APIXABAN SCH MG: 5 TABLET, FILM COATED ORAL at 09:04

## 2018-05-08 NOTE — HHI.PR
Subjective


Remarks


Feeling better





Lungs: ventilated





Heart: S1, S2 regular, no gallop





Abdomen: soft, no mass





Ext: no edema








Current Medications








 Medications


  (Trade)  Dose


 Ordered  Sig/Shivani


 Route  Start Time


 Stop Time Status Last Admin


 


 Sodium Chloride  500 ml @ 


 30 mls/hr  [id number]


 IV  5/7/18 08:00


    5/7/18 07:37


 


 


  (Ativan)  1 mg  ON  CALL


 SL  5/7/18 07:45


 5/10/18 07:44   


 


 


 Lactated Ringer's  1,000 ml @ 


 30 mls/hr  Q24H PRN


 IV  5/7/18 07:45


 5/10/18 07:44   


 


 


 Sodium Chloride  500 ml @ 


 30 mls/hr  [id number] PRN


 IV  5/7/18 07:45


 5/10/18 07:44   


 


 


  (Lopressor)  25 mg  ON CALL  PRN


 PO  5/7/18 07:45


 5/10/18 07:44   


 


 


  (Betadine 5%


 Antisepsis Kit)  1 applic  ON CALL  PRN


 EACH NARE  5/7/18 07:45


 5/10/18 07:44   


 


 


  (Chlorhexidine


 2% Cloth)  3 pack  ON CALL  PRN


 TOPICAL  5/7/18 07:45


 5/10/18 07:44   


 


 


  (Percocet  5-325


 Mg)  1 tab  Q4H  PRN


 PO  5/7/18 10:30


     


 


 


  (Percocet  5-325


 Mg)  2 tab  Q4H  PRN


 PO  5/7/18 10:30


     


 


 


  (Atropine Inj)  0.5 mg  UNSCH  PRN


 IV PUSH  5/7/18 10:30


     


 


 


  (Zofran Inj)  4 mg  Q4H  PRN


 IV PUSH  5/7/18 10:30


     


 


 


  (Cordarone)  200 mg  DAILY


 PO  5/7/18 10:30


    5/8/18 09:04


 


 


  (Eliquis)  5 mg  BID


 PO  5/7/18 21:00


    5/8/18 09:04


 


 


  (Lipitor)  10 mg  HS


 PO  5/7/18 21:00


    5/7/18 20:34


 











Objective





Vital Signs








  Date Time  Temp Pulse Resp B/P (MAP) Pulse Ox O2 Delivery O2 Flow Rate FiO2


 


5/8/18 13:00  75      


 


5/8/18 12:00  60      


 


5/8/18 11:00 97.6 56 19 135/67 (89) 95   


 


5/8/18 11:00  60      


 


5/8/18 10:00  94      


 


5/8/18 09:00  66      


 


5/8/18 08:00  70      


 


5/8/18 07:00     97 Room Air  


 


5/8/18 07:00 98.1 60 18 140/71 (94) 97   


 


5/8/18 07:00  71      


 


5/8/18 06:20  67      


 


5/8/18 05:05  70      


 


5/8/18 04:14  69      


 


5/8/18 03:34 98.3 66 18 103/64 (77) 94   


 


5/8/18 03:33  64      


 


5/8/18 02:00  64      


 


5/8/18 01:05  70      


 


5/8/18 00:00  63      


 


5/7/18 23:28 97.8 60 18 100/52 (68) 97   


 


5/7/18 23:00  61      


 


5/7/18 22:00  62      


 


5/7/18 21:00  62      


 


5/7/18 20:00  72      


 


5/7/18 19:45     95 Room Air  


 


5/7/18 19:45 97.8 66 18 111/63 (79) 95   


 


5/7/18 19:00  65      


 


5/7/18 15:00 98.0 61 14 118/75 (89) 98   














I/O      


 


 5/7/18 5/7/18 5/7/18 5/8/18 5/8/18 5/8/18





 07:00 15:00 23:00 07:00 15:00 23:00


 


Intake Total  0 ml 480 ml 240 ml  


 


Output Total   1225 ml   


 


Balance  0 ml -745 ml 240 ml  


 


      


 


Intake Oral   480 ml 240 ml  


 


IV Total  0 ml    


 


Output Urine Total   1225 ml   


 


# Voids    2  








Result Diagram:  


5/7/18 0705 5/7/18 0705








Assessment and Plan


Problem List:  


(1) Atrial fibrillation


ICD Codes:  I48.91 - Unspecified atrial fibrillation


Plan:  In sinus rhythm


SP ablation


Doing well


can be DH


 case discussed extensively with wife


She was worry about left neck "apparent lump". There is no change on inspection


There was no subclavian, no jugular access. This is only fat tissue


follow up as previously scheduled





(2) Palpitations


ICD Codes:  R00.2 - Palpitations


Plan:  No tachy reported





(3) Shortness of breath


ICD Codes:  R06.02 - Shortness of breath


Plan:  Significantly improve














Leidy Mc MD May 8, 2018 13:38

## 2018-05-08 NOTE — EKG
Date Performed: 05/07/2018       Time Performed: 10:53:47

 

PTAGE:      67 years

 

EKG:      Sinus rhythm 

 

 NORMAL ECG 

 

NO PREVIOUS TRACING            

 

DOCTOR:   Josias Galicia  Interpretating Date/Time  05/08/2018 14:32:46

## 2018-05-08 NOTE — EKG
Date Performed: 05/08/2018       Time Performed: 04:27:30

 

PTAGE:      67 years

 

EKG:      Sinus rhythm 

 

 Prolonged QT interval rSr'(V1) - probable normal variant Septal T wave changes are nonspecific Low Q
RS voltages in limb leads Borderline ECG

 

PREVIOUS TRACING       : 05/07/2018 10.53

 

DOCTOR:   Josias Galicia  Interpretating Date/Time  05/08/2018 10:51:47

## 2018-05-09 NOTE — EKG
Date Performed: 05/07/2018       Time Performed: 07:31:00

 

PTAGE:      67 years

 

EKG:      Atrial fibrillation with PVC(s). Possible septal infarct - age undetermined Inferior T wave
 changes are nonspecific Low QRS voltages in limb leads Abnormal ECG 

 

NO PREVIOUS TRACING            

 

DOCTOR:   Josias Galicia  Interpretating Date/Time  05/09/2018 11:56:08